# Patient Record
Sex: FEMALE | URBAN - METROPOLITAN AREA
[De-identification: names, ages, dates, MRNs, and addresses within clinical notes are randomized per-mention and may not be internally consistent; named-entity substitution may affect disease eponyms.]

---

## 2019-12-16 ENCOUNTER — EMERGENCY (EMERGENCY)
Facility: HOSPITAL | Age: 54
LOS: 0 days | Discharge: HOME | End: 2019-12-16
Attending: EMERGENCY MEDICINE
Payer: MEDICAID

## 2019-12-16 VITALS
OXYGEN SATURATION: 97 % | HEART RATE: 63 BPM | DIASTOLIC BLOOD PRESSURE: 85 MMHG | TEMPERATURE: 98 F | SYSTOLIC BLOOD PRESSURE: 167 MMHG | RESPIRATION RATE: 18 BRPM

## 2019-12-16 VITALS
HEIGHT: 65 IN | OXYGEN SATURATION: 100 % | TEMPERATURE: 98 F | HEART RATE: 69 BPM | WEIGHT: 188.94 LBS | SYSTOLIC BLOOD PRESSURE: 157 MMHG | DIASTOLIC BLOOD PRESSURE: 111 MMHG | RESPIRATION RATE: 22 BRPM

## 2019-12-16 DIAGNOSIS — Z98.51 TUBAL LIGATION STATUS: Chronic | ICD-10-CM

## 2019-12-16 LAB
ALBUMIN SERPL ELPH-MCNC: 3.6 G/DL — SIGNIFICANT CHANGE UP (ref 3.3–5)
ALP SERPL-CCNC: 93 U/L — SIGNIFICANT CHANGE UP (ref 40–120)
ALT FLD-CCNC: 25 U/L — SIGNIFICANT CHANGE UP (ref 12–78)
ANION GAP SERPL CALC-SCNC: 5 MMOL/L — SIGNIFICANT CHANGE UP (ref 5–17)
APTT BLD: 34.1 SEC — SIGNIFICANT CHANGE UP (ref 28.5–37)
AST SERPL-CCNC: 15 U/L — SIGNIFICANT CHANGE UP (ref 15–37)
BASOPHILS # BLD AUTO: 0.04 K/UL — SIGNIFICANT CHANGE UP (ref 0–0.2)
BASOPHILS NFR BLD AUTO: 0.9 % — SIGNIFICANT CHANGE UP (ref 0–2)
BILIRUB SERPL-MCNC: 0.3 MG/DL — SIGNIFICANT CHANGE UP (ref 0.2–1.2)
BUN SERPL-MCNC: 12 MG/DL — SIGNIFICANT CHANGE UP (ref 7–23)
CALCIUM SERPL-MCNC: 8.9 MG/DL — SIGNIFICANT CHANGE UP (ref 8.5–10.1)
CHLORIDE SERPL-SCNC: 106 MMOL/L — SIGNIFICANT CHANGE UP (ref 96–108)
CK MB BLD-MCNC: 0.9 % — SIGNIFICANT CHANGE UP (ref 0–3.5)
CK MB CFR SERPL CALC: 1.6 NG/ML — SIGNIFICANT CHANGE UP (ref 0.5–3.6)
CK SERPL-CCNC: 176 U/L — SIGNIFICANT CHANGE UP (ref 26–192)
CO2 SERPL-SCNC: 30 MMOL/L — SIGNIFICANT CHANGE UP (ref 22–31)
CREAT SERPL-MCNC: 0.97 MG/DL — SIGNIFICANT CHANGE UP (ref 0.5–1.3)
D DIMER BLD IA.RAPID-MCNC: <150 NG/ML DDU — SIGNIFICANT CHANGE UP
EOSINOPHIL # BLD AUTO: 0.08 K/UL — SIGNIFICANT CHANGE UP (ref 0–0.5)
EOSINOPHIL NFR BLD AUTO: 1.7 % — SIGNIFICANT CHANGE UP (ref 0–6)
GLUCOSE SERPL-MCNC: 87 MG/DL — SIGNIFICANT CHANGE UP (ref 70–99)
HCG SERPL-ACNC: 2 MIU/ML — SIGNIFICANT CHANGE UP
HCT VFR BLD CALC: 36.4 % — SIGNIFICANT CHANGE UP (ref 34.5–45)
HGB BLD-MCNC: 11.4 G/DL — LOW (ref 11.5–15.5)
IMM GRANULOCYTES NFR BLD AUTO: 0.2 % — SIGNIFICANT CHANGE UP (ref 0–1.5)
INR BLD: 1.03 RATIO — SIGNIFICANT CHANGE UP (ref 0.88–1.16)
LYMPHOCYTES # BLD AUTO: 2.03 K/UL — SIGNIFICANT CHANGE UP (ref 1–3.3)
LYMPHOCYTES # BLD AUTO: 44 % — SIGNIFICANT CHANGE UP (ref 13–44)
MCHC RBC-ENTMCNC: 24.8 PG — LOW (ref 27–34)
MCHC RBC-ENTMCNC: 31.3 GM/DL — LOW (ref 32–36)
MCV RBC AUTO: 79.3 FL — LOW (ref 80–100)
MONOCYTES # BLD AUTO: 0.39 K/UL — SIGNIFICANT CHANGE UP (ref 0–0.9)
MONOCYTES NFR BLD AUTO: 8.5 % — SIGNIFICANT CHANGE UP (ref 2–14)
NEUTROPHILS # BLD AUTO: 2.06 K/UL — SIGNIFICANT CHANGE UP (ref 1.8–7.4)
NEUTROPHILS NFR BLD AUTO: 44.7 % — SIGNIFICANT CHANGE UP (ref 43–77)
NRBC # BLD: 0 /100 WBCS — SIGNIFICANT CHANGE UP (ref 0–0)
NT-PROBNP SERPL-SCNC: 277 PG/ML — HIGH (ref 0–125)
PLATELET # BLD AUTO: 304 K/UL — SIGNIFICANT CHANGE UP (ref 150–400)
POTASSIUM SERPL-MCNC: 3.6 MMOL/L — SIGNIFICANT CHANGE UP (ref 3.5–5.3)
POTASSIUM SERPL-SCNC: 3.6 MMOL/L — SIGNIFICANT CHANGE UP (ref 3.5–5.3)
PROT SERPL-MCNC: 7.5 GM/DL — SIGNIFICANT CHANGE UP (ref 6–8.3)
PROTHROM AB SERPL-ACNC: 11.5 SEC — SIGNIFICANT CHANGE UP (ref 10–12.9)
RBC # BLD: 4.59 M/UL — SIGNIFICANT CHANGE UP (ref 3.8–5.2)
RBC # FLD: 16.4 % — HIGH (ref 10.3–14.5)
SODIUM SERPL-SCNC: 141 MMOL/L — SIGNIFICANT CHANGE UP (ref 135–145)
TROPONIN I SERPL-MCNC: <.015 NG/ML — SIGNIFICANT CHANGE UP (ref 0.01–0.04)
TROPONIN I SERPL-MCNC: <.015 NG/ML — SIGNIFICANT CHANGE UP (ref 0.01–0.04)
WBC # BLD: 4.61 K/UL — SIGNIFICANT CHANGE UP (ref 3.8–10.5)
WBC # FLD AUTO: 4.61 K/UL — SIGNIFICANT CHANGE UP (ref 3.8–10.5)

## 2019-12-16 PROCEDURE — 93010 ELECTROCARDIOGRAM REPORT: CPT

## 2019-12-16 PROCEDURE — 71045 X-RAY EXAM CHEST 1 VIEW: CPT | Mod: 26

## 2019-12-16 PROCEDURE — 99285 EMERGENCY DEPT VISIT HI MDM: CPT

## 2019-12-16 RX ORDER — VALSARTAN 80 MG/1
0 TABLET ORAL
Qty: 0 | Refills: 0 | DISCHARGE

## 2019-12-16 RX ORDER — HYDRALAZINE HCL 50 MG
0 TABLET ORAL
Qty: 0 | Refills: 0 | DISCHARGE

## 2019-12-16 RX ORDER — KETOROLAC TROMETHAMINE 30 MG/ML
30 SYRINGE (ML) INJECTION ONCE
Refills: 0 | Status: DISCONTINUED | OUTPATIENT
Start: 2019-12-16 | End: 2019-12-16

## 2019-12-16 RX ORDER — LABETALOL HCL 100 MG
0 TABLET ORAL
Qty: 0 | Refills: 0 | DISCHARGE

## 2019-12-16 RX ADMIN — Medication 0.1 MILLIGRAM(S): at 12:04

## 2019-12-16 RX ADMIN — Medication 30 MILLIGRAM(S): at 08:18

## 2019-12-16 RX ADMIN — Medication 30 MILLIGRAM(S): at 10:29

## 2019-12-16 NOTE — ED ADULT NURSE NOTE - NSIMPLEMENTINTERV_GEN_ALL_ED
Implemented All Fall with Harm Risk Interventions:  [place] to call system. Call bell, personal items and telephone within reach. Instruct patient to call for assistance. Room bathroom lighting operational. Non-slip footwear when patient is off stretcher. Physically safe environment: no spills, clutter or unnecessary equipment. Stretcher in lowest position, wheels locked, appropriate side rails in place. Provide visual cue, wrist band, yellow gown, etc. Monitor gait and stability. Monitor for mental status changes and reorient to person, place, and time. Review medications for side effects contributing to fall risk. Reinforce activity limits and safety measures with patient and family. Provide visual clues: red socks.

## 2019-12-16 NOTE — ED ADULT TRIAGE NOTE - CHIEF COMPLAINT QUOTE
sob last night around 11pm, this morning increase sob with mid chest pains while getting for work. sob last night around 11pm, this morning increase sob with mid chest pains while getting  ready for work.

## 2019-12-16 NOTE — ED ADULT NURSE NOTE - CHIEF COMPLAINT QUOTE
sob last night around 11pm, this morning increase sob with mid chest pains while getting  ready for work.

## 2019-12-16 NOTE — ED PROVIDER NOTE - PATIENT PORTAL LINK FT
You can access the FollowMyHealth Patient Portal offered by [facility] by registering at the following website: [email or web address]. By joining [employer]’s FollowMyHealth portal, you will also be able to view your health information using other applications (apps) compatible with our system.

## 2019-12-16 NOTE — ED PROVIDER NOTE - CLINICAL SUMMARY MEDICAL DECISION MAKING FREE TEXT BOX
pt chest pain now gone -will dc with PMD follow up in 2-3 days or with Dr. Hernandez if not improved.

## 2019-12-16 NOTE — ED PROVIDER NOTE - OBJECTIVE STATEMENT
54 year old female with PMH of HTN presenting to ED due to midchest pain -with cough since last night -pt states pain on inspiration otherwise no fever/chills, some malaise and  back pain noted. Denies any significant previous cardiac issues.

## 2019-12-19 DIAGNOSIS — R07.9 CHEST PAIN, UNSPECIFIED: ICD-10-CM

## 2019-12-19 DIAGNOSIS — I10 ESSENTIAL (PRIMARY) HYPERTENSION: ICD-10-CM

## 2019-12-19 DIAGNOSIS — Z98.51 TUBAL LIGATION STATUS: ICD-10-CM

## 2019-12-19 DIAGNOSIS — R06.02 SHORTNESS OF BREATH: ICD-10-CM

## 2020-01-01 ENCOUNTER — OUTPATIENT (OUTPATIENT)
Dept: OUTPATIENT SERVICES | Facility: HOSPITAL | Age: 55
LOS: 1 days | End: 2020-01-01
Payer: MEDICAID

## 2020-01-01 DIAGNOSIS — Z98.51 TUBAL LIGATION STATUS: Chronic | ICD-10-CM

## 2020-01-01 PROCEDURE — G9001: CPT

## 2020-01-09 DIAGNOSIS — Z71.89 OTHER SPECIFIED COUNSELING: ICD-10-CM

## 2020-01-09 PROBLEM — I10 ESSENTIAL (PRIMARY) HYPERTENSION: Chronic | Status: ACTIVE | Noted: 2019-12-16

## 2023-05-30 NOTE — ED ADULT TRIAGE NOTE - HEIGHT IN INCHES
May 30, 2023       Dheeraj Garcia MD  [address] [facility] [address]  Via In Basket      Patient: Claudia Wiley   YOB: 1953   Date of Visit: 2023       Dear Dr. Garcia:    I saw your patient, Claudia Wiley, for an evaluation. Below are my notes for this visit with her.    If you have questions, please do not hesitate to call me.      Sincerely,        Garfield Jimenez MD        CC: No Recipients  Garfield Jimenez MD  2023 10:58 AM  Signed  RHEUMATOLOGY FOLLOW UP VISIT    Name: Claudia Wiley  : 1953     Referred by: Dheeraj Garcia MD    Chief Complaint   Patient presents with   • Lupus     7 month follow-up visit.   No pain reported today.       HISTORY OF PRESENT ILLNESS  This is a   69 year old female here today for follow up for  Lupus, Sjogren's and osteoporosis.  She has also has a history of heel fracture , hyperlipidemia , hypothyroidism, hypokalemia, multinodular thyroid , GERD, breast cancer , and actinic keratoses      She was initially diagnosed in  in [facility]. At that time, she presented with the symptoms of generalized rash, fever, arthralgia, photosensitivity, malar rash and hair loss. Initial labs showed positive MARY ELLEN with low complements. At that time, anti-Juarez was also positive. In addition, she had positive anti-SSA as well as anti-SSB. Anti-SSB was consistent with diagnosis of Sjogren's syndrome; however, anti-double stranded DNA was negative.  However her more recent ANAs and other antibodies have been negative.      Past medications: hydroxychloroquine( stopped due to abdominal discomfort / ophthalmologist was concerned about toxicity in  ( was on [id number]) - was evaluated by another ophthalmologist who did not agree with that evaluation- was recommended to try taking once daily on last visit .  She has not been on MTX, azathioprine ( reluctant to start on these ) , patient has been reluctant to start treatment for  osteoporosis   Also declines DEXA scan  She is still  on prednisone 9 mg daily.  She had tried to taper down but not successful but feels better on prednisone 9 mg daily  She has declined to start any other new medicines for SLE and Sjogren's including methotrexate and or azathioprine.  She was doing fairly well until about 1 week ago when she developed abdominal pain fatigue joint pain and rashes on the face after being outside in the cold weather watching her grand son play softball  She briefly increased prednisone by an additional 5 mg on one day but now back on prednisone 9 mg daily.  She does have a history of malar rash and photosensitivity as well as raynauds      REVIEW OF SYSTEMS  Constitutional: no fevers or chills 4lb weight loss, has fatigue  EYES: has dry eyes, no blurred vision, no diplopia, no history of iritis  ENT:  has dry mouth,no oral ulcers, no dysphagia, no chronic sinus disease   Cardiovascular: no chest pain orthopnea no PND no palpitations  Respiratory: no cough no shortness of breath no wheezing no stridor no dyspnea on exertion  GI: no nausea, no vomiting, no diarrhea, no constipation no heartburn  :no dysuria urgency no hematuria  YAMEL: as above has raynauds  Heme:no history of deep venous thrombosis or pulmonary embolism no anemia, no swollen glands  CNS: + tingling + numbness,no weakness, no ataxia  PSY :has anxiety/depression  INTEGUMENTARY: mild hair loss, + rashes  ENDO: No polyuria, no polydipsia    Past Medical History:   Diagnosis Date   • Breast cancer (CMD) 2001    L breast, s/p mastectomy, Dr Guzman   • Depression    • Frequent UTI     saw Dr Jimenez, workup negative   • Hyperlipidemia    • Hypothyroidism    • Osteoporosis 04/04/2018    takes calcium and Vit D   • Sjogren's syndrome (CMD)         Past Surgical History:   Procedure Laterality Date   • Breast enhancement surgery Left     implant s/p mastectomy   • Cystoscopy      Dr Stef jimenez, for frequent UTIs and hematuria,  negative   • Mastectomy modified radical Left 2001    breast CA   • Total abdom hysterectomy  1992    fibroids, endometriosis, DUB       Social History     Tobacco Use   • Smoking status: Never   • Smokeless tobacco: Never   Vaping Use   • Vaping Use: never used   Substance Use Topics   • Alcohol use: Not Currently   • Drug use: Never       Current Outpatient Medications   Medication Sig Dispense Refill   • levothyroxine 88 MCG tablet TAKE 1 TABLET BY MOUTH EVERY DAY 90 tablet 3   • doxepin (SINEquan) 10 MG capsule TAKE 4 CAPSULES BY MOUTH AT BEDTIME 360 capsule 3   • omeprazole (PriLOSEC) 40 MG capsule TAKE 1 CAPSULE BY MOUTH EVERY DAY 90 capsule 3   • predniSONE (DELTASONE) 1 MG tablet TAKE 4 TABLETS BY MOUTH EVERY  tablet 10   • ciprofloxacin (Cipro) 250 MG tablet Take 1 tablet by mouth in the morning and 1 tablet in the evening. 6 tablet 0   • predniSONE (DELTASONE) 5 MG tablet TAKE 1 TABLET BY MOUTH EVERY DAY 90 tablet 11   • cholecalciferol (VITAMIN D3) 1000 UNITS tablet      • Multiple Vitamins-Minerals (MULTIVITAMIN ADULT PO) Take 1 tablet by mouth.     • Acetaminophen (TYLENOL PO)      • Cetirizine HCl 10 MG Cap      • fluticasone (FLONASE) 50 MCG/ACT nasal spray Spray 2 sprays in each nostril daily. 48 g 3     No current facility-administered medications for this visit.           PHYSICAL EXAM    Vitals:    05/30/23 0921   BP: 126/81   BP Location: RUE - Right upper extremity   Patient Position: Sitting   Cuff Size: Regular   Pulse: 84   Resp: 16   Temp: 98.6 °F (37 °C)   TempSrc: Tympanic   Weight: 61.2 kg (134 lb 14.7 oz)   Height: 5' 1\" (1.549 m)   PainSc:  0   LMP: 06/01/1992     Body mass index is 25.49 kg/m².     Skin: no ulcers, no malar rash, no petechia no purpura.    Head and Face: Atraumatic no deformities normocephalic normal facies hair thinning noted  Eyes: Pink conjunctiva, anicteric sclera, no periorbital swelling, no ptosis, pupils reactive to light, extraocular muscles intact.  has  dry eyes.    ENT: No oral ulcers, no nasal ulcers,  no sinus tenderness, no malar rash, no temporal artery tenderness, no oral thrush, has dry mouth, no oral ulcers.  No TMJ tenderness     Neck: Fairly good range of motion of C-spine, no paracervical tenderness, no goiter, no adenopathy, no supraclavicular masses.    Cardiac exam: S1-S2 regular, no murmurs.    Lungs: clear, no rales or wheezing, no abnormal breath sounds, good breath sounds bilaterally.    Abdomen: no hepatomegaly or splenomegaly or tenderness, no masses, no ascites.    Back: no SI joint tenderness, no paralumbar tenderness, mild scoliosis noted   musculoskeletal exam:  Good range of motion bilateral shoulder joint with no tenderness  Bilateral elbows no synovitis or tenderness  Bilateral wrist joints no synovitis or tenderness  Bilateral MCP joints no synovitis or tenderness  Bilateral PIP joints no synovitis and tenderness  Bilateral DIP joints no synovitis or tenderness  Both knees no effusion warmth or tenderness no knee instability knees puffy  Both ankles no synovitis or tenderness  Bilateral MTP joints no synovitis or tenderness no dactylitis  Good range of motion bilateral hip joints with no tenderness    Neurological exam: Normal gait, normal motor strength in upper and lower extremity, normal muscle tone, no tremors alert oriented x3.  good bilateral hand , no muscle atrophy.      LABs  CMP glucose 74 BUN 7 creatinine 0.58 normal LFTs  Urine analysis no proteins  Protein creatinine ratio normal  ESR 2 mm/h  CBC normal WBC of 5.5 hemoglobin of 12.7 platelet 2 56,000  MARY ELLEN negative  C3 and C4 complement normal          ASSESSMENT/PLAN  1.  SLE declines any specific medications for lupus.  Try to reduce prednisone to 8 mg daily-Labs reviewed  Based on labs lupus in remission  2.  Sjogren's syndrome increase p.o. fluid intake  3.  Osteoporosis declines any treatment  4.  Fibromyalgia-in addition she also has fibromyalgia which could explain  some of her symptoms  5.  Health maintenance declines pneumococcal vaccine.  Return to clinic in 6 months       Orders Placed This Encounter   • CBC with Automated Differential   • Complement C3   • Complement C4   • Comprehensive Metabolic Panel   • Sedimentation Rate   • Urinalysis With Microscopy & Culture If Indicated   • Protein/Creatinine Ratio, Urine   • Sjogren'S Syndrome         Risks of medical conditions and side effects of medication were discussed.  Medical compliance with plan discussed and risks of non-compliance reviewed.    Patient education completed on disease process, etiology & prognosis.    Patient expresses understanding of the plan.    Return to clinic as clinically indicated as discussed with patient who verbalized understanding of & agreement with the plan.       Garfield Jimenez MD                 5

## 2024-09-04 PROBLEM — Z00.00 ENCOUNTER FOR PREVENTIVE HEALTH EXAMINATION: Status: ACTIVE | Noted: 2024-09-04

## 2024-09-25 ENCOUNTER — APPOINTMENT (OUTPATIENT)
Dept: ENDOCRINOLOGY | Facility: CLINIC | Age: 59
End: 2024-09-25

## 2024-09-30 ENCOUNTER — APPOINTMENT (OUTPATIENT)
Dept: ENDOCRINOLOGY | Facility: CLINIC | Age: 59
End: 2024-09-30